# Patient Record
Sex: FEMALE | Race: WHITE | Employment: FULL TIME | URBAN - METROPOLITAN AREA
[De-identification: names, ages, dates, MRNs, and addresses within clinical notes are randomized per-mention and may not be internally consistent; named-entity substitution may affect disease eponyms.]

---

## 2019-08-21 ENCOUNTER — HOSPITAL ENCOUNTER (EMERGENCY)
Age: 24
Discharge: HOME | End: 2019-08-21
Payer: COMMERCIAL

## 2019-08-21 ENCOUNTER — APPOINTMENT (OUTPATIENT)
Dept: GENERAL RADIOLOGY | Age: 24
End: 2019-08-21
Payer: COMMERCIAL

## 2019-08-21 VITALS
WEIGHT: 209 LBS | TEMPERATURE: 97.5 F | BODY MASS INDEX: 33.59 KG/M2 | OXYGEN SATURATION: 98 % | HEIGHT: 66 IN | SYSTOLIC BLOOD PRESSURE: 120 MMHG | DIASTOLIC BLOOD PRESSURE: 77 MMHG | HEART RATE: 82 BPM | RESPIRATION RATE: 18 BRPM

## 2019-08-21 DIAGNOSIS — S60.221A CONTUSION OF RIGHT HAND, INITIAL ENCOUNTER: Primary | ICD-10-CM

## 2019-08-21 PROCEDURE — L3809 WHFO W/O JOINTS PRE OTS: HCPCS

## 2019-08-21 PROCEDURE — 73110 X-RAY EXAM OF WRIST: CPT

## 2019-08-21 PROCEDURE — 99283 EMERGENCY DEPT VISIT LOW MDM: CPT

## 2019-08-21 PROCEDURE — 4500000023 HC ED LEVEL 3 PROCEDURE

## 2019-08-21 PROCEDURE — 73130 X-RAY EXAM OF HAND: CPT

## 2019-08-21 ASSESSMENT — PAIN DESCRIPTION - PROGRESSION: CLINICAL_PROGRESSION: GRADUALLY WORSENING

## 2019-08-21 ASSESSMENT — PAIN DESCRIPTION - PAIN TYPE: TYPE: ACUTE PAIN

## 2019-08-21 ASSESSMENT — PAIN DESCRIPTION - LOCATION: LOCATION: HAND

## 2019-08-21 ASSESSMENT — PAIN DESCRIPTION - FREQUENCY: FREQUENCY: CONTINUOUS

## 2019-08-21 ASSESSMENT — PAIN SCALES - GENERAL: PAINLEVEL_OUTOF10: 5

## 2019-08-21 ASSESSMENT — PAIN DESCRIPTION - ONSET: ONSET: GRADUAL

## 2019-08-21 ASSESSMENT — PAIN DESCRIPTION - ORIENTATION: ORIENTATION: RIGHT

## 2019-08-26 ENCOUNTER — TELEPHONE (OUTPATIENT)
Dept: ORTHOPEDIC SURGERY | Age: 24
End: 2019-08-26

## 2024-04-07 ENCOUNTER — HOSPITAL ENCOUNTER (EMERGENCY)
Age: 29
Discharge: HOME | End: 2024-04-07
Attending: EMERGENCY MEDICINE
Payer: COMMERCIAL

## 2024-04-07 ENCOUNTER — APPOINTMENT (OUTPATIENT)
Dept: GENERAL RADIOLOGY | Age: 29
End: 2024-04-07
Payer: COMMERCIAL

## 2024-04-07 VITALS
SYSTOLIC BLOOD PRESSURE: 122 MMHG | TEMPERATURE: 100.2 F | OXYGEN SATURATION: 98 % | WEIGHT: 179 LBS | BODY MASS INDEX: 28.77 KG/M2 | HEIGHT: 66 IN | DIASTOLIC BLOOD PRESSURE: 75 MMHG | HEART RATE: 113 BPM | RESPIRATION RATE: 18 BRPM

## 2024-04-07 DIAGNOSIS — J18.9 PNEUMONIA OF RIGHT LOWER LOBE DUE TO INFECTIOUS ORGANISM: Primary | ICD-10-CM

## 2024-04-07 LAB
FLUAV RNA UPPER RESP QL NAA+PROBE: NEGATIVE
FLUBV AG NPH QL: NEGATIVE

## 2024-04-07 PROCEDURE — 6370000000 HC RX 637 (ALT 250 FOR IP): Performed by: EMERGENCY MEDICINE

## 2024-04-07 PROCEDURE — 99284 EMERGENCY DEPT VISIT MOD MDM: CPT

## 2024-04-07 PROCEDURE — 71046 X-RAY EXAM CHEST 2 VIEWS: CPT

## 2024-04-07 PROCEDURE — 87804 INFLUENZA ASSAY W/OPTIC: CPT

## 2024-04-07 RX ORDER — ACETAMINOPHEN 500 MG
1000 TABLET ORAL ONCE
Status: COMPLETED | OUTPATIENT
Start: 2024-04-07 | End: 2024-04-07

## 2024-04-07 RX ORDER — AZITHROMYCIN 250 MG/1
500 TABLET, FILM COATED ORAL ONCE
Status: COMPLETED | OUTPATIENT
Start: 2024-04-07 | End: 2024-04-07

## 2024-04-07 RX ORDER — ALBUTEROL SULFATE 90 UG/1
2 AEROSOL, METERED RESPIRATORY (INHALATION) 4 TIMES DAILY PRN
Qty: 18 G | Refills: 0 | Status: SHIPPED | OUTPATIENT
Start: 2024-04-07

## 2024-04-07 RX ORDER — IPRATROPIUM BROMIDE AND ALBUTEROL SULFATE 2.5; .5 MG/3ML; MG/3ML
1 SOLUTION RESPIRATORY (INHALATION) ONCE
Status: COMPLETED | OUTPATIENT
Start: 2024-04-07 | End: 2024-04-07

## 2024-04-07 RX ORDER — AZITHROMYCIN 250 MG/1
250 TABLET, FILM COATED ORAL DAILY
Qty: 4 TABLET | Refills: 0 | Status: SHIPPED | OUTPATIENT
Start: 2024-04-08 | End: 2024-04-12

## 2024-04-07 RX ORDER — BENZONATATE 200 MG/1
200 CAPSULE ORAL 3 TIMES DAILY PRN
Qty: 30 CAPSULE | Refills: 0 | Status: SHIPPED | OUTPATIENT
Start: 2024-04-07 | End: 2024-04-14

## 2024-04-07 RX ADMIN — IPRATROPIUM BROMIDE AND ALBUTEROL SULFATE 1 DOSE: 2.5; .5 SOLUTION RESPIRATORY (INHALATION) at 12:34

## 2024-04-07 RX ADMIN — ACETAMINOPHEN 1000 MG: 500 TABLET ORAL at 12:55

## 2024-04-07 RX ADMIN — AZITHROMYCIN 500 MG: 250 TABLET, FILM COATED ORAL at 14:06

## 2024-04-07 ASSESSMENT — PAIN DESCRIPTION - DESCRIPTORS: DESCRIPTORS: ACHING

## 2024-04-07 ASSESSMENT — PAIN DESCRIPTION - FREQUENCY: FREQUENCY: CONTINUOUS

## 2024-04-07 ASSESSMENT — PAIN - FUNCTIONAL ASSESSMENT: PAIN_FUNCTIONAL_ASSESSMENT: 0-10

## 2024-04-07 ASSESSMENT — PAIN DESCRIPTION - LOCATION: LOCATION: GENERALIZED

## 2024-04-07 ASSESSMENT — PAIN SCALES - GENERAL: PAINLEVEL_OUTOF10: 5

## 2024-04-07 ASSESSMENT — PAIN DESCRIPTION - PAIN TYPE: TYPE: ACUTE PAIN

## 2024-04-07 NOTE — ED TRIAGE NOTES
Pt states that she went to urgent care on Friday and was tested for flu and covid.  Covid was negative.  Flu was \"unsure\".  Started on Tamiflu and was advised to be seen if she got worse.  States that she feels worse. Cough is worse and is SOB.  Took Ibuprofen early this morning.  Daughter had pneumonia 2 weeks ago

## 2024-04-07 NOTE — ED PROVIDER NOTES
[facility] EMERGENCY DEPARTMENT  EMERGENCY DEPARTMENT ENCOUNTER        Pt Name: Amelie Garg  MRN: [id number]  Birthdate 1995  Date of evaluation: 2024  Provider: Shanelle Santos MD  PCP: Del Pan, CHLOE - CNP  Note Started: 12:44 PM EDT 24    CHIEF COMPLAINT       Chief Complaint   Patient presents with    Cough    Shortness of Breath    Fever     Pt states that she went to urgent care on Friday and was tested for flu and covid.  Covid was negative.  Flu was \"unsure\".  Started on Tamiflu and was advised to be seen if she got worse.  States that she feels worse. Cough is worse and is SOB.  Took Ibuprofen early this morning.         HISTORY OF PRESENT ILLNESS: 1 or more Elements     History from : Patient    Limitations to history : None    Amelie Garg is a 29 y.o. female who presents emergency department with cough, shortness of breath and fever for 5 days.  She went to the urgent care 2 days ago.  She tested negative for COVID.  She states that they were \"unsure\" if she had the flu or not so they decided to put her on Tamiflu.  Patient has been nauseous since starting that medication.  She has been alternating ibuprofen and Tylenol.  She states the cough is worse and feels slightly short of breath with this.  She is requesting a chest x-ray    Nursing Notes were all reviewed and agreed with or any disagreements were addressed in the HPI.    REVIEW OF SYSTEMS :      Review of Systems    10 systems reviewed and negative except as in HPI/MDM    SURGICAL HISTORY     Past Surgical History:   Procedure Laterality Date     SECTION         CURRENTMEDICATIONS       Previous Medications    No medications on file       ALLERGIES     Patient has no known allergies.    FAMILYHISTORY       Family History   Problem Relation Age of Onset    High Blood Pressure Father     Heart Disease Paternal Grandmother     Diabetes Paternal Grandmother         SOCIAL HISTORY       Social History     Tobacco

## 2025-01-22 ENCOUNTER — HOSPITAL ENCOUNTER (OUTPATIENT)
Dept: GENERAL RADIOLOGY | Age: 30
Discharge: HOME | End: 2025-01-22
Payer: COMMERCIAL

## 2025-01-22 ENCOUNTER — HOSPITAL ENCOUNTER (OUTPATIENT)
Age: 30
Discharge: HOME | End: 2025-01-22
Payer: COMMERCIAL

## 2025-01-22 DIAGNOSIS — M54.42 ACUTE BILATERAL LOW BACK PAIN WITH BILATERAL SCIATICA: ICD-10-CM

## 2025-01-22 DIAGNOSIS — M54.41 ACUTE BILATERAL LOW BACK PAIN WITH BILATERAL SCIATICA: ICD-10-CM

## 2025-01-22 PROCEDURE — 72170 X-RAY EXAM OF PELVIS: CPT

## 2025-01-22 PROCEDURE — 72100 X-RAY EXAM L-S SPINE 2/3 VWS: CPT

## 2025-03-31 ENCOUNTER — HOSPITAL ENCOUNTER (EMERGENCY)
Age: 30
Discharge: HOME | End: 2025-03-31
Attending: EMERGENCY MEDICINE
Payer: COMMERCIAL

## 2025-03-31 VITALS
TEMPERATURE: 97.8 F | HEIGHT: 66 IN | DIASTOLIC BLOOD PRESSURE: 75 MMHG | WEIGHT: 184.3 LBS | HEART RATE: 72 BPM | SYSTOLIC BLOOD PRESSURE: 135 MMHG | RESPIRATION RATE: 16 BRPM | BODY MASS INDEX: 29.62 KG/M2 | OXYGEN SATURATION: 100 %

## 2025-03-31 DIAGNOSIS — S39.012A BACK STRAIN, INITIAL ENCOUNTER: Primary | ICD-10-CM

## 2025-03-31 LAB
BILIRUB UR QL STRIP.AUTO: NEGATIVE
CLARITY UR: CLEAR
COLOR UR: YELLOW
GLUCOSE UR STRIP.AUTO-MCNC: NEGATIVE MG/DL
HCG UR QL: NEGATIVE
HGB UR QL STRIP.AUTO: NEGATIVE
KETONES UR STRIP.AUTO-MCNC: NEGATIVE MG/DL
LEUKOCYTE ESTERASE UR QL STRIP.AUTO: NEGATIVE
NITRITE UR QL STRIP.AUTO: NEGATIVE
PH UR STRIP.AUTO: 6 [PH] (ref 5–8)
PROT UR STRIP.AUTO-MCNC: NEGATIVE MG/DL
SP GR UR STRIP.AUTO: >=1.03 (ref 1–1.03)
UA COMPLETE W REFLEX CULTURE PNL UR: NORMAL
UA DIPSTICK W REFLEX MICRO PNL UR: NORMAL
URN SPEC COLLECT METH UR: NORMAL
UROBILINOGEN UR STRIP-ACNC: 0.2 E.U./DL

## 2025-03-31 PROCEDURE — 81003 URINALYSIS AUTO W/O SCOPE: CPT

## 2025-03-31 PROCEDURE — 99283 EMERGENCY DEPT VISIT LOW MDM: CPT

## 2025-03-31 PROCEDURE — 84703 CHORIONIC GONADOTROPIN ASSAY: CPT

## 2025-03-31 PROCEDURE — 6370000000 HC RX 637 (ALT 250 FOR IP): Performed by: EMERGENCY MEDICINE

## 2025-03-31 RX ORDER — CYCLOBENZAPRINE HCL 10 MG
10 TABLET ORAL 3 TIMES DAILY PRN
Qty: 21 TABLET | Refills: 0 | Status: SHIPPED | OUTPATIENT
Start: 2025-03-31 | End: 2025-04-10

## 2025-03-31 RX ORDER — ACETAMINOPHEN 500 MG
1000 TABLET ORAL ONCE
Status: COMPLETED | OUTPATIENT
Start: 2025-03-31 | End: 2025-03-31

## 2025-03-31 RX ORDER — IBUPROFEN 800 MG/1
800 TABLET, FILM COATED ORAL EVERY 8 HOURS PRN
Qty: 30 TABLET | Refills: 0 | Status: SHIPPED | OUTPATIENT
Start: 2025-03-31

## 2025-03-31 RX ORDER — LIDOCAINE 4 G/G
1 PATCH TOPICAL DAILY
Status: DISCONTINUED | OUTPATIENT
Start: 2025-03-31 | End: 2025-03-31 | Stop reason: HOSPADM

## 2025-03-31 RX ORDER — MELOXICAM 15 MG/1
15 TABLET ORAL DAILY PRN
COMMUNITY

## 2025-03-31 RX ORDER — LIDOCAINE 50 MG/G
1 PATCH TOPICAL DAILY
Qty: 10 PATCH | Refills: 0 | Status: SHIPPED | OUTPATIENT
Start: 2025-03-31 | End: 2025-04-10

## 2025-03-31 RX ADMIN — ACETAMINOPHEN 1000 MG: 500 TABLET ORAL at 10:18

## 2025-03-31 ASSESSMENT — PAIN DESCRIPTION - DESCRIPTORS: DESCRIPTORS: CRUSHING

## 2025-03-31 ASSESSMENT — PAIN DESCRIPTION - PAIN TYPE: TYPE: ACUTE PAIN

## 2025-03-31 ASSESSMENT — PAIN - FUNCTIONAL ASSESSMENT: PAIN_FUNCTIONAL_ASSESSMENT: 0-10

## 2025-03-31 ASSESSMENT — PAIN DESCRIPTION - FREQUENCY: FREQUENCY: CONTINUOUS

## 2025-03-31 ASSESSMENT — PAIN DESCRIPTION - ORIENTATION: ORIENTATION: RIGHT;LEFT;LOWER

## 2025-03-31 ASSESSMENT — PAIN SCALES - GENERAL: PAINLEVEL_OUTOF10: 8

## 2025-03-31 ASSESSMENT — PAIN DESCRIPTION - LOCATION: LOCATION: BACK;HIP

## 2025-03-31 ASSESSMENT — LIFESTYLE VARIABLES
HOW MANY STANDARD DRINKS CONTAINING ALCOHOL DO YOU HAVE ON A TYPICAL DAY: PATIENT DOES NOT DRINK
HOW OFTEN DO YOU HAVE A DRINK CONTAINING ALCOHOL: NEVER

## 2025-03-31 NOTE — ED PROVIDER NOTES
[facility] EMERGENCY DEPARTMENT  EMERGENCY DEPARTMENT ENCOUNTER        Pt Name: Amelie Garg  MRN: [id number]  Birthdate 1995  Date of evaluation: 3/31/2025  Provider: Shanelle Santos MD  PCP: Del Pan APRN - CNP  Note Started: 9:41 AM EDT 3/31/25    CHIEF COMPLAINT       Chief Complaint   Patient presents with    Back Pain     Back pain that starts at her tailbone and works it way down both hips.         HISTORY OF PRESENT ILLNESS: 1 or more Elements     History from : Patient    Limitations to history : None    Amelie Garg is a 29 y.o. female who presents to the emergency department with low back pain.  She states it starts in her tailbone and goes down both of her upper hips.  She denies abdominal pain, flank pain, dysuria, hematuria.  She denies fever, chills, nausea or vomiting.  She denies numbness or tingling going down her legs.  She took meloxicam this morning but it did not help her pain.  She is able to ambulate.  She states the day before this started she was sitting on the floor shaving her dog for awhile    Nursing Notes were all reviewed and agreed with or any disagreements were addressed in the HPI.    REVIEW OF SYSTEMS :      Review of Systems    10 systems reviewed and negative except as in HPI/MDM    SURGICAL HISTORY     Past Surgical History:   Procedure Laterality Date     SECTION         CURRENTMEDICATIONS       Previous Medications    ALBUTEROL SULFATE HFA (VENTOLIN HFA) 108 (90 BASE) MCG/ACT INHALER    Inhale 2 puffs into the lungs 4 times daily as needed for Wheezing    MELOXICAM (MOBIC) 15 MG TABLET    Take 1 tablet by mouth daily as needed for Pain       ALLERGIES     Patient has no known allergies.    FAMILYHISTORY       Family History   Problem Relation Age of Onset    High Blood Pressure Father     Heart Disease Paternal Grandmother     Diabetes Paternal Grandmother         SOCIAL HISTORY       Social History     Tobacco Use    Smoking status: Never